# Patient Record
Sex: FEMALE | Race: WHITE | NOT HISPANIC OR LATINO | Employment: FULL TIME | URBAN - METROPOLITAN AREA
[De-identification: names, ages, dates, MRNs, and addresses within clinical notes are randomized per-mention and may not be internally consistent; named-entity substitution may affect disease eponyms.]

---

## 2018-12-27 ENCOUNTER — VIRTUAL VISIT (OUTPATIENT)
Dept: FAMILY MEDICINE | Facility: OTHER | Age: 38
End: 2018-12-27

## 2019-07-06 ENCOUNTER — HOSPITAL ENCOUNTER (EMERGENCY)
Facility: CLINIC | Age: 39
Discharge: HOME | End: 2019-07-07
Attending: EMERGENCY MEDICINE | Admitting: EMERGENCY MEDICINE
Payer: COMMERCIAL

## 2019-07-06 DIAGNOSIS — F10.129 HANGOVER (H): ICD-10-CM

## 2019-07-06 DIAGNOSIS — F41.9 ANXIETY AND DEPRESSION: ICD-10-CM

## 2019-07-06 DIAGNOSIS — F32.A ANXIETY AND DEPRESSION: ICD-10-CM

## 2019-07-06 DIAGNOSIS — F33.9 RECURRENT MAJOR DEPRESSION (H): ICD-10-CM

## 2019-07-06 DIAGNOSIS — F10.920 ALCOHOLIC INTOXICATION WITHOUT COMPLICATION (H): ICD-10-CM

## 2019-07-06 DIAGNOSIS — F41.9 ANXIETY: ICD-10-CM

## 2019-07-06 PROCEDURE — 90791 PSYCH DIAGNOSTIC EVALUATION: CPT

## 2019-07-06 PROCEDURE — 82075 ASSAY OF BREATH ETHANOL: CPT | Mod: 91

## 2019-07-06 PROCEDURE — 99285 EMERGENCY DEPT VISIT HI MDM: CPT | Mod: 25

## 2019-07-06 PROCEDURE — 99284 EMERGENCY DEPT VISIT MOD MDM: CPT | Mod: Z6 | Performed by: EMERGENCY MEDICINE

## 2019-07-06 PROCEDURE — 82075 ASSAY OF BREATH ETHANOL: CPT

## 2019-07-06 RX ORDER — SERTRALINE HYDROCHLORIDE 100 MG/1
200 TABLET, FILM COATED ORAL DAILY
COMMUNITY

## 2019-07-06 ASSESSMENT — MIFFLIN-ST. JEOR: SCORE: 1416.64

## 2019-07-06 NOTE — ED AVS SNAPSHOT
[facility], [place], Emergency Department  [address]  [place] [address]  Phone:  [phone]  Fax:  [phone]                                    Leslie Wilkins   MRN: [id number]    Department:  [facility], Emergency Department   Date of Visit:  7/6/2019           After Visit Summary Signature Page    I have received my discharge instructions, and my questions have been answered. I have discussed any challenges I see with this plan with the nurse or doctor.    ..........................................................................................................................................  Patient/Patient Representative Signature      ..........................................................................................................................................  Patient Representative Print Name and Relationship to Patient    ..................................................               ................................................  Date                                   Time    ..........................................................................................................................................  Reviewed by Signature/Title    ...................................................              ..............................................  Date                                               Time          22EPIC Rev 08/18

## 2019-07-07 VITALS
DIASTOLIC BLOOD PRESSURE: 65 MMHG | RESPIRATION RATE: 96 BRPM | OXYGEN SATURATION: 98 % | SYSTOLIC BLOOD PRESSURE: 123 MMHG | HEART RATE: 98 BPM | WEIGHT: 160 LBS | BODY MASS INDEX: 26.66 KG/M2 | TEMPERATURE: 98.5 F | HEIGHT: 65 IN

## 2019-07-07 LAB
ALCOHOL BREATH TEST: 0.03 (ref 0–0.01)
ALCOHOL BREATH TEST: 0.12 (ref 0–0.01)

## 2019-07-07 RX ORDER — HYDROXYZINE PAMOATE 25 MG/1
25 CAPSULE ORAL 4 TIMES DAILY PRN
Qty: 40 CAPSULE | Refills: 0 | Status: SHIPPED | OUTPATIENT
Start: 2019-07-07

## 2019-07-07 ASSESSMENT — ENCOUNTER SYMPTOMS: DYSPHORIC MOOD: 1

## 2019-07-07 NOTE — ED PROVIDER NOTES
History     Chief Complaint   Patient presents with     Suicidal     patient increasing depression, pt feeling alone, wanting to be with dad, but had family issues     Alcohol Intoxication     alcohol breath test of 0.117     HPI  Leslie Wilkins is a 38 year old female with a history of depression and anxiety who presents to the ED with alcohol intoxication and passive SI. The patient was with her boyfriend tonight and they had a few drinks. She started to feel more sad. She notes that her mom killed herself when she was in 5th grade, and that her parents  5 years prior to that. Dad remarried and was neglectful of her, possibly because she reminded him of the mother. Today, she has had thoughts of sadness because she is not close to her dad. She tried reaching out to him in her 20's unsuccessfully. Her brother is an alcoholic. Later in the evening she left back to her house, and was talking to her boyfriends sister and then started to walk away from the situation. She left to come here to the ED. She drove here, and her alcohol level was .117. Her boyfriend has not reached out to her. She has passive SI, stating that if someone had a shot to euthanize her she would take it. She has no past suicide attempts. She is future oriented. She is more depressed thinking of her dad or brother dying, she has no kids, and feels alone.     The patient is currently on 200mg of Zoloft for 1.5 years. She feels like she needs something more to help with her anxiety but her PCP won't prescribe her anything else. She hasn't tried hydroxyzine. She was prescribed Abilify but had insurance problems and never ended up taking it. She doesn't have a therapist and is not sure if she is interested in getting one. She would like a quick fix. She has a job.  She now denies any active suicidal thoughts or intent or plan.   Reports occasional alcohol use and was drinking tonight. Denies drug use. No recent illness or injury.  I have  "reviewed the Medications, Allergies, Past Medical and Surgical History, and Social History in the [facility] system.  History reviewed. No pertinent past medical history.    Past Surgical History:   Procedure Laterality Date     ORTHOPEDIC SURGERY         History reviewed. No pertinent family history.    Social History     Tobacco Use     Smoking status: Current Some Day Smoker     Smokeless tobacco: Never Used   Substance Use Topics     Alcohol use: Yes       No current facility-administered medications for this encounter.      Current Outpatient Medications   Medication     hydrOXYzine (VISTARIL) 25 MG capsule     sertraline (ZOLOFT) 100 MG tablet        Allergies   Allergen Reactions     Depo-Estradiol [Estradiol] Hives     Percocet [Oxycodone-Acetaminophen] Hives       Review of Systems   Constitutional: Positive for fatigue. Negative for appetite change, chills and fever.   HENT: Negative for congestion, rhinorrhea and sore throat.    Eyes: Negative for visual disturbance.   Respiratory: Negative for cough, chest tightness and shortness of breath.    Cardiovascular: Negative for chest pain, palpitations and leg swelling.   Gastrointestinal: Negative for abdominal pain, diarrhea, nausea and vomiting.   Genitourinary: Negative for dysuria.   Musculoskeletal: Negative for arthralgias, gait problem, myalgias, neck pain and neck stiffness.   Skin: Negative for rash.   Allergic/Immunologic: Negative for immunocompromised state.   Neurological: Negative for dizziness, light-headedness and headaches.   Hematological: Does not bruise/bleed easily.   Psychiatric/Behavioral: Positive for dysphoric mood and suicidal ideas. Negative for agitation, confusion, hallucinations and self-injury. The patient is nervous/anxious.    All other systems reviewed and are negative.      Physical Exam   BP: 119/76  Pulse: 100  Temp: 99  F (37.2  C)  Resp: (!) 96  Height: 165.1 cm (5' 5\")  Weight: 72.6 kg (160 lb)  SpO2: 96 %      Physical Exam "   Constitutional: She is oriented to person, place, and time. She appears well-developed and well-nourished. No distress.   HENT:   Head: Normocephalic and atraumatic.   Nose: Nose normal.   Mouth/Throat: Oropharynx is clear and moist.   Eyes: Pupils are equal, round, and reactive to light. Conjunctivae and EOM are normal.   Neck: Normal range of motion. Neck supple.   Cardiovascular: Normal rate, regular rhythm, normal heart sounds and intact distal pulses.   Pulmonary/Chest: Effort normal and breath sounds normal. No respiratory distress. She exhibits no tenderness.   Abdominal: Soft. There is no tenderness.   Musculoskeletal: Normal range of motion. She exhibits no tenderness.        Cervical back: She exhibits no tenderness.        Thoracic back: She exhibits no tenderness.        Lumbar back: She exhibits no tenderness.   Neurological: She is alert and oriented to person, place, and time.   Skin: Skin is warm and dry. No rash noted.   Psychiatric: Her speech is normal and behavior is normal. Thought content normal. Her mood appears anxious. She is not agitated, not aggressive, not withdrawn and not actively hallucinating. Thought content is not paranoid and not delusional. Cognition and memory are normal. She exhibits a depressed mood. She expresses no homicidal and no suicidal ideation.   Nursing note and vitals reviewed.      ED Course        Procedures             Critical Care time:  none             Labs Ordered and Resulted from Time of ED Arrival Up to the Time of Departure from the ED   ALCOHOL BREATH TEST POCT - Abnormal; Notable for the following components:       Result Value    Alcohol Breath Test 0.117 (*)     All other components within normal limits   ALCOHOL BREATH TEST POCT - Abnormal; Notable for the following components:    Alcohol Breath Test 0.033 (*)     All other components within normal limits   DRUG ABUSE SCREEN 6 CHEM DEP URINE ([facility])   HCG QUALITATIVE URINE            Assessments &  Plan (with Medical Decision Making)   Anxiety and depression. Alcohol intoxication. Observed in ED and re-evaluated when sober. She now denies any thoughts of harming herself or others. No denies any suicidal thoughts. She is given resources for mental health services and will start trial of hydroxyzine for anxiety. Discussed risks, benefits and alternatives to medications. Mental health and primary care follow up.  She is not in imminent risk of self harm. She feels safe and wants to go home. I have reviewed the nursing notes.    I have reviewed the findings, diagnosis, plan and need for follow up with the patient.       Medication List      Started    hydrOXYzine 25 MG capsule  Commonly known as:  VISTARIL  25 mg, Oral, 4 TIMES DAILY PRN            Final diagnoses:   Anxiety and depression   Alcoholic intoxication without complication (H)     I, Niki Currie, am serving as a trained medical scribe to document services personally performed by Jose J Ni MD, based on the provider's statements to me.   Jose J KELLEY MD, was physically present and have reviewed and verified the accuracy of this note documented by Niki Currie.    7/6/2019   [facility], [place], EMERGENCY DEPARTMENT     Jose J Ni MD  07/25/19 6018

## 2019-07-07 NOTE — ED NOTES
I have performed an in person assessment of the patient. Based on this assessment the patient no longer requires a one on one attendant at this point in time.    Jose J Ni MD  1:50 AM  July 7, 2019           Jose J Ni MD  07/07/19 0150

## 2019-07-07 NOTE — DISCHARGE INSTRUCTIONS
Follow up with therapist and crisis services if needed.  Avoid alcohol use.  Continue Zoloft.  Take hydroxyzine as needed, as directed for anxiety.  Return if persistent symptoms, especially if you feel unsafe.  Hydroxyzine can be sedating; do not drive when using it.

## 2019-07-16 ENCOUNTER — OFFICE VISIT (OUTPATIENT)
Dept: INTERNAL MEDICINE | Facility: CLINIC | Age: 39
End: 2019-07-16
Payer: COMMERCIAL

## 2019-07-16 VITALS
DIASTOLIC BLOOD PRESSURE: 76 MMHG | WEIGHT: 162 LBS | OXYGEN SATURATION: 96 % | HEIGHT: 65 IN | SYSTOLIC BLOOD PRESSURE: 122 MMHG | TEMPERATURE: 97.2 F | RESPIRATION RATE: 17 BRPM | HEART RATE: 91 BPM | BODY MASS INDEX: 26.99 KG/M2

## 2019-07-16 DIAGNOSIS — J45.30 MILD PERSISTENT ASTHMA WITHOUT COMPLICATION: Primary | ICD-10-CM

## 2019-07-16 DIAGNOSIS — F41.9 ANXIETY: ICD-10-CM

## 2019-07-16 DIAGNOSIS — F32.1 MODERATE MAJOR DEPRESSION (H): ICD-10-CM

## 2019-07-16 PROCEDURE — 99203 OFFICE O/P NEW LOW 30 MIN: CPT | Performed by: INTERNAL MEDICINE

## 2019-07-16 RX ORDER — MULTIPLE VITAMINS W/ MINERALS TAB 9MG-400MCG
1 TAB ORAL DAILY
COMMUNITY

## 2019-07-16 RX ORDER — ALBUTEROL SULFATE 90 UG/1
1-2 AEROSOL, METERED RESPIRATORY (INHALATION)
COMMUNITY
Start: 2019-01-29

## 2019-07-16 RX ORDER — PAROXETINE 20 MG/1
20 TABLET, FILM COATED ORAL EVERY MORNING
Qty: 30 TABLET | Refills: 0 | Status: SHIPPED | OUTPATIENT
Start: 2019-07-16

## 2019-07-16 ASSESSMENT — ANXIETY QUESTIONNAIRES
5. BEING SO RESTLESS THAT IT IS HARD TO SIT STILL: NOT AT ALL
IF YOU CHECKED OFF ANY PROBLEMS ON THIS QUESTIONNAIRE, HOW DIFFICULT HAVE THESE PROBLEMS MADE IT FOR YOU TO DO YOUR WORK, TAKE CARE OF THINGS AT HOME, OR GET ALONG WITH OTHER PEOPLE: VERY DIFFICULT
GAD7 TOTAL SCORE: 9
3. WORRYING TOO MUCH ABOUT DIFFERENT THINGS: SEVERAL DAYS
1. FEELING NERVOUS, ANXIOUS, OR ON EDGE: MORE THAN HALF THE DAYS
7. FEELING AFRAID AS IF SOMETHING AWFUL MIGHT HAPPEN: NOT AT ALL
6. BECOMING EASILY ANNOYED OR IRRITABLE: MORE THAN HALF THE DAYS
2. NOT BEING ABLE TO STOP OR CONTROL WORRYING: MORE THAN HALF THE DAYS

## 2019-07-16 ASSESSMENT — PATIENT HEALTH QUESTIONNAIRE - PHQ9: 5. POOR APPETITE OR OVEREATING: MORE THAN HALF THE DAYS

## 2019-07-16 ASSESSMENT — MIFFLIN-ST. JEOR: SCORE: 1425.71

## 2019-07-16 NOTE — PROGRESS NOTES
Subjective     Leslie Wilkins is a 38 year old female who presents to clinic today for the following health issues:    HPI      Pt is a 38 year old female who is seen here to day to f/u on ER discharge. Pt was seen in ER and [facility] with anxiety and depression ,  Pt has h/o depression and has tried Lexapro and Trintellex in the past w/o relief.  Pt is currently on Zoloft 100 mg 2 tabs daily and thinks this is not working any more.  Pt would like to change to Paxil.    Pt c/o feeling down/depressed, sleep disturbed, feels bad about self, trouble concentrating.  Feeling nervous/anxious, worrying a lot, easily annoyed. No suicidal ideations at this time  Pt has  appt with [facility] on 08/13/19     Asthma Follow-Up    Was ACT completed today?    Yes    ACT Total Scores 7/16/2019   ACT TOTAL SCORE (Goal Greater than or Equal to 20) 22   In the past 12 months, how many times did you visit the emergency room for your asthma without being admitted to the hospital? 0   In the past 12 months, how many times were you hospitalized overnight because of your asthma? 0       How many days per week do you miss taking your asthma controller medication?  0    Please describe any recent triggers for your asthma: None    Have you had any Emergency Room Visits, Urgent Care Visits, or Hospital Admissions since your last office visit?  No          Patient Active Problem List   Diagnosis     Mild persistent asthma without complication     Anxiety     Moderate major depression (H)     Past Surgical History:   Procedure Laterality Date     ORTHOPEDIC SURGERY       SHOULDER SURGERY Left        Social History     Tobacco Use     Smoking status: Former Smoker     Smokeless tobacco: Never Used     Tobacco comment: quit - 2018 on chantix    Substance Use Topics     Alcohol use: Yes     Comment: 2 times a week      History reviewed. No pertinent family history.      Current Outpatient Medications   Medication Sig Dispense Refill      "albuterol (VENTOLIN HFA) 108 (90 Base) MCG/ACT inhaler Inhale 1-2 puffs into the lungs       fluticasone-salmeterol (ADVAIR DISKUS) 100-50 MCG/DOSE inhaler Inhale 1 puff into the lungs       hydrOXYzine (VISTARIL) 25 MG capsule Take 1 capsule (25 mg) by mouth 4 times daily as needed for anxiety 40 capsule 0     multivitamin w/minerals (MULTI-VITAMIN) tablet Take 1 tablet by mouth daily       PARoxetine (PAXIL) 20 MG tablet Take 1 tablet (20 mg) by mouth every morning 30 tablet 0     sertraline (ZOLOFT) 100 MG tablet Take 200 mg by mouth daily           Reviewed and updated as needed this visit by Provider         Review of Systems   ROS COMP: CONSTITUTIONAL: NEGATIVE for fever, chills, change in weight  EYES: NEGATIVE for vision changes or irritation  RESP: NEGATIVE for significant cough or SOB  CV: NEGATIVE for chest pain, palpitations or peripheral edema  NEURO: NEGATIVE for weakness, dizziness or paresthesias  PSYCHIATRIC: anxiety and depressed mood  Ros otherwise negative        Objective    /76   Pulse 91   Temp 97.2  F (36.2  C) (Oral)   Resp 17   Ht 1.651 m (5' 5\")   Wt 73.5 kg (162 lb)   SpO2 96%   BMI 26.96 kg/m    Body mass index is 26.96 kg/m .  Physical Exam   GENERAL: healthy, alert and no distress  EYES: Eyes grossly normal to inspection, PERRL and conjunctivae and sclerae normal  NECK: no adenopathy, no asymmetry, masses, or scars and thyroid normal to palpation  RESP: lungs clear to auscultation - no rales, rhonchi or wheezes  CV: regular rate and rhythm,    MS: no gross musculoskeletal defects noted, no edema  NEURO: Normal strength and tone, mentation intact and speech normal          Assessment & Plan       (F32.1) Moderate major depression (H)  (F41.9) Anxiety  Plan: pt was started on PARoxetine (PAXIL) 20 MG tablet daily as directed.explained clearly about the medication,insructions and side effects.pt was advised to taper down Zoloft, tapering instructions given. Has appt with " psychiatrist on 08/13/19 .  Pt is seeing Therapist             (J45.30) Mild persistent asthma without complication    Plan: asthma controlled. Continue Advair and albuterol as directed.       Cecelia Whitaker MD  [facility]

## 2019-07-16 NOTE — NURSING NOTE
"/76   Pulse 91   Temp 97.2  F (36.2  C) (Oral)   Resp 17   Ht 1.651 m (5' 5\")   Wt 73.5 kg (162 lb)   SpO2 96%   BMI 26.96 kg/m    Patient in for hospital follow up.  Annita Cabral CMA    "

## 2019-07-17 ASSESSMENT — ASTHMA QUESTIONNAIRES: ACT_TOTALSCORE: 22

## 2019-07-18 PROBLEM — F32.1 MODERATE MAJOR DEPRESSION (H): Status: ACTIVE | Noted: 2019-07-18

## 2019-07-18 PROBLEM — F41.9 ANXIETY: Status: ACTIVE | Noted: 2019-07-18

## 2019-07-18 ASSESSMENT — PATIENT HEALTH QUESTIONNAIRE - PHQ9: SUM OF ALL RESPONSES TO PHQ QUESTIONS 1-9: 11

## 2019-07-19 ASSESSMENT — ANXIETY QUESTIONNAIRES: GAD7 TOTAL SCORE: 9

## 2019-07-25 ASSESSMENT — ENCOUNTER SYMPTOMS
VOMITING: 0
COUGH: 0
APPETITE CHANGE: 0
CHEST TIGHTNESS: 0
AGITATION: 0
NAUSEA: 0
PALPITATIONS: 0
CONFUSION: 0
SORE THROAT: 0
MYALGIAS: 0
NECK PAIN: 0
CHILLS: 0
LIGHT-HEADEDNESS: 0
DIZZINESS: 0
FATIGUE: 1
NERVOUS/ANXIOUS: 1
HEADACHES: 0
DYSURIA: 0
RHINORRHEA: 0
NECK STIFFNESS: 0
FEVER: 0
HALLUCINATIONS: 0
DIARRHEA: 0
ARTHRALGIAS: 0
SHORTNESS OF BREATH: 0
BRUISES/BLEEDS EASILY: 0
ABDOMINAL PAIN: 0

## 2019-11-08 ENCOUNTER — HEALTH MAINTENANCE LETTER (OUTPATIENT)
Age: 39
End: 2019-11-08

## 2020-02-23 ENCOUNTER — HEALTH MAINTENANCE LETTER (OUTPATIENT)
Age: 40
End: 2020-02-23

## 2020-12-06 ENCOUNTER — HEALTH MAINTENANCE LETTER (OUTPATIENT)
Age: 40
End: 2020-12-06

## 2021-01-29 ENCOUNTER — IMMUNIZATION (OUTPATIENT)
Dept: NURSING | Facility: CLINIC | Age: 41
End: 2021-01-29
Payer: COMMERCIAL

## 2021-01-29 PROCEDURE — 91300 PR COVID VAC PFIZER DIL RECON 30 MCG/0.3 ML IM: CPT

## 2021-01-29 PROCEDURE — 0001A PR COVID VAC PFIZER DIL RECON 30 MCG/0.3 ML IM: CPT

## 2021-02-19 ENCOUNTER — IMMUNIZATION (OUTPATIENT)
Dept: NURSING | Facility: CLINIC | Age: 41
End: 2021-02-19
Attending: PHYSICIAN ASSISTANT
Payer: COMMERCIAL

## 2021-02-19 PROCEDURE — 0002A PR COVID VAC PFIZER DIL RECON 30 MCG/0.3 ML IM: CPT

## 2021-02-19 PROCEDURE — 91300 PR COVID VAC PFIZER DIL RECON 30 MCG/0.3 ML IM: CPT

## 2021-09-25 ENCOUNTER — HEALTH MAINTENANCE LETTER (OUTPATIENT)
Age: 41
End: 2021-09-25

## 2021-10-19 PROBLEM — F32.9 MAJOR DEPRESSION: Status: ACTIVE | Noted: 2019-07-18

## 2022-01-15 ENCOUNTER — HEALTH MAINTENANCE LETTER (OUTPATIENT)
Age: 42
End: 2022-01-15

## 2023-01-07 ENCOUNTER — HEALTH MAINTENANCE LETTER (OUTPATIENT)
Age: 43
End: 2023-01-07

## 2023-04-22 ENCOUNTER — HEALTH MAINTENANCE LETTER (OUTPATIENT)
Age: 43
End: 2023-04-22

## 2023-08-31 ENCOUNTER — HOSPITAL ENCOUNTER (EMERGENCY)
Facility: CLINIC | Age: 43
Discharge: HOME | End: 2023-08-31
Attending: EMERGENCY MEDICINE | Admitting: EMERGENCY MEDICINE
Payer: COMMERCIAL

## 2023-08-31 VITALS
DIASTOLIC BLOOD PRESSURE: 93 MMHG | SYSTOLIC BLOOD PRESSURE: 124 MMHG | OXYGEN SATURATION: 97 % | HEIGHT: 66 IN | BODY MASS INDEX: 32.14 KG/M2 | HEART RATE: 94 BPM | RESPIRATION RATE: 16 BRPM | WEIGHT: 200 LBS | TEMPERATURE: 98.5 F

## 2023-08-31 DIAGNOSIS — M25.512 ACUTE PAIN OF LEFT SHOULDER: ICD-10-CM

## 2023-08-31 PROCEDURE — 99283 EMERGENCY DEPT VISIT LOW MDM: CPT | Mod: 25

## 2023-08-31 PROCEDURE — 20552 NJX 1/MLT TRIGGER POINT 1/2: CPT

## 2023-08-31 RX ORDER — LIDOCAINE 50 MG/G
1 PATCH TOPICAL EVERY 24 HOURS
Qty: 5 PATCH | Refills: 0 | Status: SHIPPED | OUTPATIENT
Start: 2023-08-31

## 2023-08-31 RX ORDER — LIDOCAINE 50 MG/G
1 PATCH TOPICAL EVERY 24 HOURS
Qty: 5 PATCH | Refills: 0 | Status: SHIPPED | OUTPATIENT
Start: 2023-08-31 | End: 2023-08-31

## 2023-08-31 NOTE — ED PROVIDER NOTES
"  History     Chief Complaint:  No chief complaint on file.       HPI   Leslie Wilkins is a 42 year old female with a history of left shoulder dislocation, status post left shoulder arthroscopy 3/10/2023, who presents with posterior left shoulder pain.  Pain started about a week ago.  No preceding trauma.  She describes a \"knot\" over the left scapula.  It is slightly worse with movement.  Occasionally, the pain seems to radiate into the left bicep, and even forearm.  Sometimes she has some tingling in the left arm.  She denies any neck pain.  Since her arthroscopic surgery of the shoulder, she has not had any recurrent shoulder dislocation, and denies any pain in the actual joint itself.  She went to [facility] 2 days ago, and was prescribed prednisone and a muscle relaxer.  The prednisone has caused diarrhea, but has done nothing to help her pain.  She is also been taking ibuprofen without relief.  Radiograph was done at [facility] that was reportedly negative.  She has been having to leave her work early every day this week because of the pain.  She has not taken the muscle relaxer, because she was that it was only to help with sleeping, and she has no trouble sleeping.      Independent Historian:   None - Patient Only    Review of External Notes:   I reviewed history of arthroscopic surgery 3/10/23.      Medications:    albuterol (VENTOLIN HFA) 108 (90 Base) MCG/ACT inhaler  fluticasone-salmeterol (ADVAIR DISKUS) 100-50 MCG/DOSE inhaler  hydrOXYzine (VISTARIL) 25 MG capsule  multivitamin w/minerals (MULTI-VITAMIN) tablet  PARoxetine (PAXIL) 20 MG tablet  sertraline (ZOLOFT) 100 MG tablet        Past Medical History:    Recurrent shoulder dislocation    Past Surgical History:    Past Surgical History:   Procedure Laterality Date    ORTHOPEDIC SURGERY      SHOULDER SURGERY Left         Physical Exam   Patient Vitals for the past 24 hrs:   BP Temp Pulse Resp SpO2 Height Weight   08/31/23 1115 (!) " "124/93 98.5  F (36.9  C) 94 16 97 % 1.676 m (5' 6\") 90.7 kg (200 lb)        Physical Exam  Gen:  Pleasant, appears stated age.    Eye:   Sclera non-injected.      Extremity Exam: Full AROM of all joints without pain except the following:  L UE  Inspection:  No swelling, deformity, or discoloration.  No rash over the scapula.  Palpation: Focal tenderness over the subscapularis muscle.  Shoulder and arm are non-tender, no warmth or edema.  ROM: Full ROM in shoulder extension, flexion, abduction, adduction, internal and external rotation without pain.  Strength: thumb strength,  strength, okay sign, finger abduction, finger adduction, wrist flexion and wrist extension 5/5  Sensation: median, radial, and ulnar fine touch sensation intact  Distal Pulses: intact radial, CR < 2 seconds      Neurologic:    Non-focal exam without asymmetric weakness or numbness.    Fluent speech.    Psychiatric:     Normal affect with appropriate interaction with examiner.      Emergency Department Course   Procedures   Procedure Note:  Verbal consent obtained.  Trigger point injection  Indication - muscle spasm and pain  Area cleaned with alcohol swab.  3cc of 0.5% bupivacaine injected in muscle at subscapularis.   Pt tolerated well.  Band-aid applied.      Emergency Department Course & Assessments:    Interventions:  Medications - No data to display     Assessments:  I rechecked the patient.  Minimal change after trigger point injection.    Independent Interpretation (X-rays, CTs, rhythm strip):  None    Consultations/Discussion of Management or Tests:  None        Social Determinants of Health affecting care:   None    Disposition:  The patient was discharged to home.     Impression & Plan    CMS Diagnoses: none      Medical Decision Making:  Leslie Wilkins is a 42 year old female, left-hand-dominant, who presents with shoulder pain.  On exam, the patient is neurovascularly intact.  Overall, symptoms are consistent with muscle spasm " involving the subscapularis muscle.  We attempted a trigger point injection, but without significant pain relief.  It may be that I was unable to get deep enough to help relieve the muscle spasm.  Overall, exam and symptoms are not consistent with radiculopathy.  Otherwise, no evidence to suggest dislocation, fracture, septic arthritis, DVT, PE, arterial ischemia, or other dangerous condition.  I did not recommend work-up for PE as the patient finds that the pain is worse with movement, and palpation.  No other findings on exam or history to suggest PE, including lower extremity swelling, history of hypercoagulability, hypoxia, tachycardia, shortness of breath, chest pain, lightheadedness.  I recommended additional intervention such as lidocaine patch, warm compresses, actually taking the muscle relaxer, and possibly acupuncture.  Return to the ED for worsening pain, new symptoms such as fever, arm swelling, weakness, or other concerns.      Diagnosis:    ICD-10-CM    1. Acute pain of left shoulder  M25.512 Primary Care Referral           Discharge Medications:  Lidocaine patch 5%    8/31/2023   Rebekah Yeung MD Pepper, Tracy Lynn, MD  09/01/23 1448       Rebekah Yeung MD  09/01/23 1448

## 2023-08-31 NOTE — DISCHARGE INSTRUCTIONS
Her symptoms today do not seem consistent with a nerve compression, rotator cuff injury, dislocated or broken shoulder.  I suspect a muscle strain or muscle spasm.  I recommend conservative treatments, including the muscle relaxant you have already been prescribed, lidocaine patch, rotating heat and cold packs, and trying acupuncture.  Return to the ED right away for worsening pain, shoulder swelling, inability to move your shoulder, numbness or weakness in your left hand or arm, swelling in your left arm, or for any other concerns.

## 2023-08-31 NOTE — ED TRIAGE NOTES
Patient reports she has been having shoulder pain and went to [facility]. She was given steroids but states her pain is the same.      Triage Assessment       Row Name 08/31/23 1101       Triage Assessment (Adult)    Airway WDL WDL       Respiratory WDL    Respiratory WDL WDL       Cardiac WDL    Cardiac WDL WDL

## 2024-02-10 ENCOUNTER — HEALTH MAINTENANCE LETTER (OUTPATIENT)
Age: 44
End: 2024-02-10

## 2024-06-29 ENCOUNTER — HEALTH MAINTENANCE LETTER (OUTPATIENT)
Age: 44
End: 2024-06-29

## 2025-07-12 ENCOUNTER — HEALTH MAINTENANCE LETTER (OUTPATIENT)
Age: 45
End: 2025-07-12